# Patient Record
Sex: FEMALE
[De-identification: names, ages, dates, MRNs, and addresses within clinical notes are randomized per-mention and may not be internally consistent; named-entity substitution may affect disease eponyms.]

---

## 2018-03-17 ENCOUNTER — HOSPITAL ENCOUNTER (EMERGENCY)
Dept: HOSPITAL 31 - C.ER | Age: 73
Discharge: HOME | End: 2018-03-17
Payer: COMMERCIAL

## 2018-03-17 VITALS — TEMPERATURE: 97 F | SYSTOLIC BLOOD PRESSURE: 140 MMHG | HEART RATE: 84 BPM | DIASTOLIC BLOOD PRESSURE: 91 MMHG

## 2018-03-17 VITALS — OXYGEN SATURATION: 98 %

## 2018-03-17 VITALS — RESPIRATION RATE: 20 BRPM

## 2018-03-17 VITALS — BODY MASS INDEX: 25.7 KG/M2

## 2018-03-17 DIAGNOSIS — J11.1: Primary | ICD-10-CM

## 2018-03-17 NOTE — C.PDOC
History Of Present Illness


73 y/o female c/o fever 103 F , headache, nasal congestion, dry cough, sore 

throat, and body aches which began last night.Denies abdominal pain, n/v/d.  





HPI: Influenza


Time Seen by Provider: 03/17/18 14:16


Chief Complaint: ENT Problem


History Per: Patient


Exam Limitations: no limitations


Onset/Duration Of Symptoms: Days


Risk factors for flu complications: Yes: adult > 65 years





Past Medical History


Reviewed: Historical Data, Nursing Documentation, Vital Signs


Vital Signs: 


 Last Vital Signs











Temp  97.6 F   03/17/18 13:32


 


Pulse  76   03/17/18 13:32


 


Resp  20   03/17/18 13:32


 


BP  146/83   03/17/18 13:32


 


Pulse Ox  98   03/17/18 13:32














- Medical History


PMH: Arthritis, Asthma, Cardia Arrhythmia (occasional tachycardia last 2 yrs ago

), Gastritis, Gall Bladder Disease, HTN, Hypercholesterolemia, Peripheral Edema


Surgical History: Cholecystectomy





- [facility] Procedures








CLOSED [NEEDLE] BIOPSY OF SALIVARY GLAND OR DUCT (12/12/14)








Family History: States: No Known Family Hx





- Social History


Hx Tobacco Use: No


Hx Alcohol Use: No


Hx Substance Use: No





- Immunization History


Hx Tetanus Toxoid Vaccination: Yes


Hx Influenza Vaccination: Yes


Hx Pneumococcal Vaccination: Yes





Review Of Systems


Constitutional: Positive for: Fever, Malaise.  Negative for: Chills


ENT: Positive for: Throat Pain


Respiratory: Positive for: Cough (dry cough)


Gastrointestinal: Negative for: Nausea, Vomiting, Abdominal Pain, Diarrhea


Neurological: Positive for: Headache





Physical Exam





- Physical Exam


Appears: Non-toxic, Other (uncomfortable)


Skin: Warm, Dry


Head: Atraumatic, Normacephalic


Eye(s): bilateral: Normal Inspection


Ear(s): Bilateral: Normal


Throat: Erythema (mildly erythematous), No Exudate, Other (submandibular nodes 

bilaterally, no tonsillar enlargement)


Cardiovascular: Rhythm Regular, No Murmur


Respiratory: No Decreased Breath Sounds, No Accessory Muscle Use, No Rales, No 

Rhonchi, No Wheezing


Gastrointestinal/Abdominal: Soft, No Tenderness


Neurological/Psych: Oriented x3, Normal Speech, Normal Motor, Normal Sensation





Medical Decision Making


Medical Decision Making: 


Plan:


--Rapid Strep


--Tamiflu PO


--Tylenol PO 








- ECG


O2 Sat by Pulse Oximetry: 98 (RA)


Pulse Ox Interpretation: Normal





Disposition


Counseled Patient/Family Regarding: Studies Performed, Diagnosis, Need For 

Followup, Rx Given





- Disposition


Referrals: 


Michael Alaniz APN [Non-Staff] - 


Disposition: HOME/ ROUTINE


Disposition Time: 15:27


Condition: STABLE


Additional Instructions: 


Please take Tamiflu as prescribed. Tylenol for pain and fever every 4 hours if 

needed. Gargle with warm salty water or listerine. Drink increased fluids. 

Follow up with your doctor on Tues as scheduled. Return to ER for any worsening 

symptoms. 


Prescriptions: 


Acetaminophen [Tylenol 325mg tab] 650 mg PO Q4 #50 tab


Oseltamivir [Tamiflu] 75 mg PO BID #9 cap


Instructions:  Flu, Adult (DC)


Forms:  [facility] (English), General Discharge Instructions





- Clinical Impression


Clinical Impression: 


 Influenza-like illness








- PA / NP / Resident Statement


MD/DO has reviewed & agrees with the documentation as recorded.





- Scribe Statement


The provider has reviewed the documentation as recorded by the Aarti Coello





Provider Attestation





All medical record entries made by the Keniaibtere were at my direction and 

personally dictated by me. I have reviewed the chart and agree that the record 

accurately reflects my personal performance of the history, physical exam, 

medical decision making, and the department course for this patient. I have 

also personally directed, reviewed, and agree with the discharge instructions 

and disposition.

## 2019-01-14 ENCOUNTER — HOSPITAL ENCOUNTER (OUTPATIENT)
Dept: HOSPITAL 31 - C.ER | Age: 74
Setting detail: OBSERVATION
LOS: 3 days | Discharge: HOME | End: 2019-01-17
Attending: INTERNAL MEDICINE | Admitting: INTERNAL MEDICINE
Payer: COMMERCIAL

## 2019-01-14 VITALS — BODY MASS INDEX: 25.7 KG/M2

## 2019-01-14 DIAGNOSIS — J45.909: ICD-10-CM

## 2019-01-14 DIAGNOSIS — S02.82XA: Primary | ICD-10-CM

## 2019-01-14 DIAGNOSIS — R00.0: ICD-10-CM

## 2019-01-14 DIAGNOSIS — S62.002A: ICD-10-CM

## 2019-01-14 DIAGNOSIS — I10: ICD-10-CM

## 2019-01-14 DIAGNOSIS — S00.83XA: ICD-10-CM

## 2019-01-14 DIAGNOSIS — M11.232: ICD-10-CM

## 2019-01-14 DIAGNOSIS — S52.602A: ICD-10-CM

## 2019-01-14 DIAGNOSIS — S52.502A: ICD-10-CM
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
## 2019-01-14 LAB
APTT BLD: 32 SECONDS (ref 21–34)
BASOPHILS # BLD AUTO: 0.1 K/UL (ref 0–0.2)
BASOPHILS NFR BLD: 1.1 % (ref 0–2)
EOSINOPHIL # BLD AUTO: 0.2 K/UL (ref 0–0.7)
EOSINOPHIL NFR BLD: 1.8 % (ref 0–4)
ERYTHROCYTE [DISTWIDTH] IN BLOOD BY AUTOMATED COUNT: 12.8 % (ref 11.5–14.5)
HGB BLD-MCNC: 13.5 G/DL (ref 11–16)
INR PPP: 1
LYMPHOCYTES # BLD AUTO: 2.6 K/UL (ref 1–4.3)
LYMPHOCYTES NFR BLD AUTO: 30.3 % (ref 20–40)
MCH RBC QN AUTO: 32.9 PG (ref 27–31)
MCHC RBC AUTO-ENTMCNC: 34.6 G/DL (ref 33–37)
MCV RBC AUTO: 95 FL (ref 81–99)
MONOCYTES # BLD: 0.5 K/UL (ref 0–0.8)
MONOCYTES NFR BLD: 5.9 % (ref 0–10)
NEUTROPHILS # BLD: 5.3 K/UL (ref 1.8–7)
NEUTROPHILS NFR BLD AUTO: 60.9 % (ref 50–75)
NRBC BLD AUTO-RTO: 0.1 % (ref 0–2)
PLATELET # BLD: 185 K/UL (ref 130–400)
PMV BLD AUTO: 8.5 FL (ref 7.2–11.7)
PROTHROMBIN TIME: 11.2 SECONDS (ref 9.7–12.2)
RBC # BLD AUTO: 4.09 MIL/UL (ref 3.8–5.2)
WBC # BLD AUTO: 8.7 K/UL (ref 4.8–10.8)

## 2019-01-14 PROCEDURE — 97110 THERAPEUTIC EXERCISES: CPT

## 2019-01-14 PROCEDURE — 85025 COMPLETE CBC W/AUTO DIFF WBC: CPT

## 2019-01-14 PROCEDURE — 85610 PROTHROMBIN TIME: CPT

## 2019-01-14 PROCEDURE — 82553 CREATINE MB FRACTION: CPT

## 2019-01-14 PROCEDURE — 70450 CT HEAD/BRAIN W/O DYE: CPT

## 2019-01-14 PROCEDURE — 71045 X-RAY EXAM CHEST 1 VIEW: CPT

## 2019-01-14 PROCEDURE — 80053 COMPREHEN METABOLIC PANEL: CPT

## 2019-01-14 PROCEDURE — 97116 GAIT TRAINING THERAPY: CPT

## 2019-01-14 PROCEDURE — 70470 CT HEAD/BRAIN W/O & W/DYE: CPT

## 2019-01-14 PROCEDURE — 93005 ELECTROCARDIOGRAM TRACING: CPT

## 2019-01-14 PROCEDURE — 93306 TTE W/DOPPLER COMPLETE: CPT

## 2019-01-14 PROCEDURE — 81001 URINALYSIS AUTO W/SCOPE: CPT

## 2019-01-14 PROCEDURE — 96374 THER/PROPH/DIAG INJ IV PUSH: CPT

## 2019-01-14 PROCEDURE — 84484 ASSAY OF TROPONIN QUANT: CPT

## 2019-01-14 PROCEDURE — 97166 OT EVAL MOD COMPLEX 45 MIN: CPT

## 2019-01-14 PROCEDURE — 70480 CT ORBIT/EAR/FOSSA W/O DYE: CPT

## 2019-01-14 PROCEDURE — 99285 EMERGENCY DEPT VISIT HI MDM: CPT

## 2019-01-14 PROCEDURE — 82550 ASSAY OF CK (CPK): CPT

## 2019-01-14 PROCEDURE — 97162 PT EVAL MOD COMPLEX 30 MIN: CPT

## 2019-01-14 PROCEDURE — 97535 SELF CARE MNGMENT TRAINING: CPT

## 2019-01-14 PROCEDURE — 97530 THERAPEUTIC ACTIVITIES: CPT

## 2019-01-14 PROCEDURE — 36415 COLL VENOUS BLD VENIPUNCTURE: CPT

## 2019-01-14 PROCEDURE — 85730 THROMBOPLASTIN TIME PARTIAL: CPT

## 2019-01-14 PROCEDURE — 73110 X-RAY EXAM OF WRIST: CPT

## 2019-01-14 PROCEDURE — 73200 CT UPPER EXTREMITY W/O DYE: CPT

## 2019-01-14 NOTE — C.PDOC
History Of Present Illness


73 year old female presents to the emergency department status-post trauma after

her dog pulled her and she fell down 3 steps. Patient denies LOC and states that

she remembers the event. Patient states that she sustained abrasions over the 

left cheekbone, and also complaints of pain to the left wrist. 





- HPI


Time Seen by Provider: 01/14/19 19:43


Chief Complaint (Nursing): Trauma


History Per: Patient


History/Exam Limitations: no limitations


Onset/Duration Of Symptoms: Hrs


Injury Occurred (Timing): Hours Ago:


Location Of Injury: Left: Wrist, Anterior: Face


Severity: Moderate


Pain Scale Rating Of: 4





Past Medical History


Reviewed: Historical Data, Nursing Documentation, Vital Signs


Vital Signs: 





                                Last Vital Signs











Temp  98 F   01/14/19 19:33


 


Pulse  98 H  01/14/19 19:33


 


Resp  20   01/14/19 19:33


 


BP  164/98 H  01/14/19 19:33


 


Pulse Ox  98   01/14/19 19:33














- Medical History


PMH: Arthritis, Asthma, Cardia Arrhythmia (occasional tachycardia last 2 yrs 

ago), Gastritis, Gall Bladder Disease, HTN, Hypercholesterolemia, Peripheral 

Edema


Surgical History: Cholecystectomy





- [facility] Procedures











CLOSED [NEEDLE] BIOPSY OF SALIVARY GLAND OR DUCT (12/12/14)








Family History: States: No Known Family Hx





- Social History


Hx Tobacco Use: No


Hx Alcohol Use: No


Hx Substance Use: No





- Immunization History


Hx Tetanus Toxoid Vaccination: Yes


Hx Influenza Vaccination: Yes


Hx Pneumococcal Vaccination: Yes





Review Of Systems


Constitutional: Negative for: Fever


Gastrointestinal: Negative for: Nausea, Vomiting


Musculoskeletal: Positive for: Hand Pain (left wrist)


Skin: Positive for: Other (abrasion)


Neurological: Negative for: Weakness, Numbness





Physical Exam





- Physical Exam


Appears: Non-toxic, No Acute Distress


Skin: Warm, Dry


Head: Normacephalic, Abrasion (over the left maxillary sinus), No Other 

(crepitus)


Eye(s): bilateral: Normal Inspection, PERRL, EOMI


Ear(s): Bilateral: Normal


Nose: Normal, No Tenderness, No Septal Hematoma


Neck: Trachea Midline, Supple


Chest: Symmetrical, No Tenderness


Cardiovascular: Rhythm Regular, No Murmur


Respiratory: No Rales, No Rhonchi, No Wheezing


Extremity: Normal ROM (to all fingers ), Tenderness (tenderness to palpation to 

the left wrist), Capillary Refill (< 2 seconds), Swelling (to the left wrist)


Pulses: Left Radial: Normal, Right Radial: Normal


Neurological/Psych: Oriented x3





ED Course And Treatment





- Laboratory Results


Result Diagrams: 


                                 01/14/19 23:33





ECG: Interpreted By Me, Viewed By Me


ECG Rhythm: Sinus Rhythm (93), 1st Degree HB, Nonspecific Changes


O2 Sat by Pulse Oximetry: 98 (RA)


Pulse Ox Interpretation: Normal





- Radiology


CXR: Interpreted by Me, Viewed By Me





- Other Rad


  ** wrist


X-Ray: Interpreted by Me, Viewed By Me


Interpretation: comminuted distal radius and ulnar fracture


Progress Note: Plan:  CT Head.  CT Orbits.  Tylenol 975mg PO.  XR Left Wrist.  

sugar tong splint applied. Placed call to dr sanford - orthopedics





Disposition


Discussed With Dr.: Shankar Lazcano


Comment: accepted the pt on his service and took over the care at 10:15 PM


Doctor Will See Patient In The: Hospital


Counseled Patient/Family Regarding: Studies Performed, Diagnosis





- Disposition


Disposition: HOSPITALIZED


Disposition Time: 19:43


Condition: FAIR





- Clinical Impression


Clinical Impression: 


 Fall, Fracture of wrist, Minor head injury, Facial contusion








- Scribe Statement


The provider has reviewed the documentation as recorded by the Scribe (Estevan 

Nuria)


Provider Attestation: 


All medical record entries made by the Scribe were at my direction and 

personally dictated by me. I have reviewed the chart and agree that the record 

accurately reflects my personal performance of the history, physical exam, 

medical decision making, and the department course for this patient. I have also

 personally directed, reviewed, and agree with the discharge instructions and 

disposition.





Decision To Admit





- Pt Status Changed To:


Hospital Disposition Of: Observation





- .


Bed Request Type: Telemetry


Admitting Physician: Shankar Lazcano


Patient Diagnosis: 


 Fall, Fracture of wrist, Minor head injury

## 2019-01-14 NOTE — CP.PCM.HP
History of Present Illness





- History of Present Illness


History of Present Illness: 





73 year old female presents to the emergency department status-post trauma after

her dog pulled her and she fell down 3 steps. Patient denies LOC and states that

she remembers the event. Patient states that she sustained abrasions over the 

left cheekbone, and also complaints of pain to the left wrist





patient has a history of cardiac arrhythmia hypertension.


The x-ray of the left wrist showed comminuted fracture of the radius and ulna 

spiral fracture on the radius there is also mild fracture of the left orbit with

abnormal CT scan of the head of the frontal lobe.





Present on Admission





- Present on Admission


Any Indicators Present on Admission: No





Review of Systems





- Review of Systems


All systems: reviewed and no additional remarkable complaints except (pain in 

the left wrist and headache)





Past Patient History





- Infectious Disease


Hx of Infectious Diseases: None





- Past Medical History & Family History


Past Medical History?: Yes





- Past Social History


Smoking Status: Never Smoked





- CARDIAC


Hx Cardia Arrhythmia: Yes (occasional tachycardia last 2 yrs ago)


Hx Hypercholesterolemia: Yes


Hx Hypertension: Yes


Hx Peripheral Edema: Yes





- PULMONARY


Hx Asthma: Yes





- NEUROLOGICAL


Hx Neurological Disorder: Yes


Hx Dizziness: Yes





- HEENT


Hx HEENT Problems: Yes


Hx Cataracts: Yes (bilat iol)





- ENDOCRINE/METABOLIC


Hx Endocrine Disorders: No





- HEMATOLOGICAL/ONCOLOGICAL


Hx Blood Disorders: Yes


Hx Shingles: Yes (3 yrs ago)





- INTEGUMENTARY


Hx Dermatological Problems: No





- MUSCULOSKELETAL/RHEUMATOLOGICAL


Hx Arthritis: Yes





- GASTROINTESTINAL


Hx Gall Bladder Disease: Yes


Hx Gastritis: Yes





- GENITOURINARY/GYNECOLOGICAL


Hx Genitourinary Disorders: No





- PSYCHIATRIC


Hx Substance Use: No





- SURGICAL HISTORY


Hx Cholecystectomy: Yes





- ANESTHESIA


Hx Anesthesia: Yes


Hx Anesthesia Reactions: No


Hx Malignant Hyperthermia: No





Meds


Allergies/Adverse Reactions: 


                                    Allergies











Allergy/AdvReac Type Severity Reaction Status Date / Time


 


aspirin Allergy   Verified 01/14/19 19:38


 


Penicillins Allergy   Verified 01/14/19 19:38














Physical Exam





- Head Exam


Additional comments: 





contusion in the left upper orbit





- Eye Exam


Eye Exam: EOMI, Normal appearance, PERRL


Pupil Exam: NORMAL ACCOMODATION, PERRL





- ENT Exam


ENT Exam: Mucous Membranes Moist, Normal Exam





- Neck Exam


Neck exam: Positive for: Normal Inspection





- Respiratory Exam


Respiratory Exam: Clear to Auscultation Bilateral, NORMAL BREATHING PATTERN





- Cardiovascular Exam


Cardiovascular Exam: REGULAR RHYTHM





- GI/Abdominal Exam


GI & Abdominal Exam: Normal Bowel Sounds, Soft.  absent: Tenderness





- Extremities Exam


Extremities exam: Positive for: normal inspection





- Expanded Upper Extremities Exam


  ** Left


Shoulder exam: absent: abrasion, crepitus, deformity, dislocation, ecchymosis, 

erythema, full ROM, laceration, swelling, tenderness, tenderness over AC joint, 

normal inspection


Upper Arm exam: absent: abrasion, crepitus, deformity, dislocation, ecchymosis, 

erythema, full ROM, laceration, swelling, tenderness, normal inspection


Forearm Wrist exam: abrasion, deformity, swelling, tenderness





- Back Exam


Back exam: absent: CVA tenderness (L), CVA tenderness (R)





- Neurological Exam


Neurological exam: Alert, CN II-XII Intact, Normal Gait, Oriented x3, Reflexes 

Normal





- Psychiatric Exam


Psychiatric exam: Normal Affect, Normal Mood





Results





- Vital Signs


Recent Vital Signs: 





                                Last Vital Signs











Temp  98.7 F   01/14/19 21:26


 


Pulse  93 H  01/14/19 22:25


 


Resp  18   01/14/19 22:25


 


BP  126/79   01/14/19 22:25


 


Pulse Ox  98   01/14/19 22:50














- Labs


Result Diagrams: 


                                 01/14/19 23:33





                                 01/14/19 23:33


Labs: 





                         Laboratory Results - last 24 hr











  01/14/19





  23:33


 


WBC  8.7


 


RBC  4.09


 


Hgb  13.5


 


Hct  38.9


 


MCV  95.0


 


MCH  32.9 H


 


MCHC  34.6


 


RDW  12.8


 


Plt Count  185


 


MPV  8.5


 


Neut % (Auto)  60.9


 


Lymph % (Auto)  30.3


 


Mono % (Auto)  5.9


 


Eos % (Auto)  1.8


 


Baso % (Auto)  1.1


 


Neut # (Auto)  5.3


 


Lymph # (Auto)  2.6


 


Mono # (Auto)  0.5


 


Eos # (Auto)  0.2


 


Baso # (Auto)  0.1














Assessment & Plan


(1) Closed fracture of left distal radius


Status: Acute   


Comment: patient will be evaluated by orthopedic service and follow the 

recommendation patient will need some cardiac workup because there is no clear 

history of how she fell   





(2) Distal end of ulna fracture, closed


Status: Acute   





(3) Facial contusion


Status: Acute   





(4) Fall


Status: Acute   





(5) Minor head injury


Status: Acute   





(6) Asthma


Status: Chronic   





(7) HTN (hypertension)


Status: Chronic   





(8) Tachycardia


Status: Acute

## 2019-01-15 VITALS — RESPIRATION RATE: 20 BRPM

## 2019-01-15 LAB
ALBUMIN SERPL-MCNC: 4.4 G/DL (ref 3.5–5)
ALBUMIN/GLOB SERPL: 1.4 {RATIO} (ref 1–2.1)
ALT SERPL-CCNC: 28 U/L (ref 9–52)
AST SERPL-CCNC: 28 U/L (ref 14–36)
BACTERIA #/AREA URNS HPF: (no result) /[HPF]
BILIRUB UR-MCNC: NEGATIVE MG/DL
BUN SERPL-MCNC: 20 MG/DL (ref 7–17)
CALCIUM SERPL-MCNC: 10.7 MG/DL (ref 8.6–10.4)
CK MB SERPL-MCNC: 1.79 NG/ML (ref 0–3.38)
CK MB SERPL-MCNC: 1.88 NG/ML (ref 0–3.38)
CK MB SERPL-MCNC: 2.4 NG/ML (ref 0–3.38)
COLOR UR: YELLOW
GFR NON-AFRICAN AMERICAN: > 60
GLUCOSE UR STRIP-MCNC: NEGATIVE MG/DL
HYALINE CASTS #/AREA URNS LPF: (no result) /LPF (ref 0–2)
LEUKOCYTE ESTERASE UR-ACNC: NEGATIVE LEU/UL
PH UR STRIP: 6 [PH] (ref 5–8)
PROT UR STRIP-MCNC: NEGATIVE MG/DL
RBC # UR STRIP: (no result) /UL
SP GR UR STRIP: 1.01 (ref 1–1.03)
SQUAMOUS EPITHIAL: 3 /HPF (ref 0–5)

## 2019-01-15 NOTE — CT
Date of service: 



01/14/2019



PROCEDURE:  CT HEAD WITHOUT CONTRAST.



HISTORY:

fall



COMPARISON:

Unenhanced head CT 12/08/2014.



TECHNIQUE:

Axial computed tomography images were obtained through the head/brain 

without intravenous contrast.  



Radiation dose:



Total exam DLP = 1125.71 mGy-cm.



This CT exam was performed using one or more of the following dose 

reduction techniques: Automated exposure control, adjustment of the 

mA and/or kV according to patient size, and/or use of iterative 

reconstruction technique.



FINDINGS:



HEMORRHAGE:

No intracranial hemorrhage. 



BRAIN:

Good corticomedullary differentiation is seen. Proportional, minimal 

diffuse expansion of the ventriculosulcal and cisternal spaces is 

appreciated with minimal white matter lucency compatible with diffuse 

cerebral atrophy and chronic microangiopathy, once again.  No 

suspicious extra-axial fluid collection is identified and the midline 

brain anatomy appears grossly nonfocal as imaged. There is no mass 

effect throughout.



VENTRICLES:

Unremarkable. No hydrocephalus. 



CALVARIUM:

Unremarkable.



PARANASAL SINUSES:

Unremarkable as visualized. No significant inflammatory changes.



MASTOID AIR CELLS:

Unremarkable as visualized. No inflammatory changes.



OTHER FINDINGS:

None.



IMPRESSION:

Stable very limited age-related neuro degenerative changes are 

reiterated as compared prior CT 12/08/2014.  No definite acute 

intracranial findings by standard CT criteria.  Follow-up CT or MRI 

are available if clinically warranted. 



[facility] preliminary report from [place], 01/14/2019 8:56 p.m..

## 2019-01-15 NOTE — CP.PCM.CON
History of Present Illness





- History of Present Illness


History of Present Illness: 





Orthopedic consultation Dr. Epps





73F complains of left wrist and left side of head pain after fall when dog 

pulled her while walking dog last night. She is RHD. Denies numbness/tingling. 

Denies pain in her legs, right arm, neck, or back after the fall. The pain in 

her wrist is severe, and her wrist is deformed. Denies CP/SOB/dizziness. Denies 

any wrist pain prior to this fall. 





Review of Systems





- Review of Systems


All systems: reviewed and no additional remarkable complaints except





- Constitutional


Additional comments: 





no fever





- Cardiovascular


Cardiovascular: As Per HPI





- Respiratory


Respiratory: As Per HPI





- Gastrointestinal


Additional comments: 





denies n/v





- Musculoskeletal


Musculoskeletal: As Per HPI





- Integumentary


Additional comments: 





abrasions to hand





- Neurological


Neurological: As Per HPI





- Hematologic/Lymphatic


Hematologic: absent: As Per HPI, Easy Bleeding, Easy Bruising, Lymphadenopathy, 

Other





Past Patient History





- Infectious Disease


Hx of Infectious Diseases: None





- Past Medical History & Family History


Past Medical History?: Yes


Past Family History: Reviewed and not pertinent





- Past Social History


Smoking Status: Never Smoked





- CARDIAC


Hx Cardia Arrhythmia: Yes (occasional tachycardia last 2 yrs ago)


Hx Hypercholesterolemia: Yes


Hx Hypertension: Yes


Hx Peripheral Edema: Yes





- PULMONARY


Hx Asthma: Yes





- NEUROLOGICAL


Hx Neurological Disorder: Yes


Hx Dizziness: Yes





- HEENT


Hx HEENT Problems: Yes


Hx Cataracts: Yes (bilat iol)





- ENDOCRINE/METABOLIC


Hx Endocrine Disorders: No





- HEMATOLOGICAL/ONCOLOGICAL


Hx Blood Disorders: Yes


Hx Shingles: Yes (3 yrs ago)





- INTEGUMENTARY


Hx Dermatological Problems: No





- MUSCULOSKELETAL/RHEUMATOLOGICAL


Hx Arthritis: Yes





- GASTROINTESTINAL


Hx Gall Bladder Disease: Yes


Hx Gastritis: Yes





- GENITOURINARY/GYNECOLOGICAL


Hx Genitourinary Disorders: No





- PSYCHIATRIC


Hx Substance Use: No





- SURGICAL HISTORY


Hx Cholecystectomy: Yes





- ANESTHESIA


Hx Anesthesia: Yes


Hx Anesthesia Reactions: No


Hx Malignant Hyperthermia: No





Meds


Allergies/Adverse Reactions: 


                                    Allergies











Allergy/AdvReac Type Severity Reaction Status Date / Time


 


aspirin Allergy   Verified 01/14/19 19:38


 


Penicillins Allergy   Verified 01/14/19 19:38














- Medications


Medications: 


                               Current Medications





Ketorolac Tromethamine (Toradol)  30 mg IVP Q6 PRN


   PRN Reason: Pain, moderate (4-7)


   Last Admin: 01/15/19 02:38 Dose:  30 mg











Physical Exam





- Constitutional


Appears: Well, No Acute Distress





- Head Exam


Additional comments: 





ecchymosis to left periorbital area





- Eye Exam


Eye Exam: Periorbital swelling, Periorbital tenderness





- Neck Exam


Neck exam: Positive for: Full Rom, Normal Inspection





- Respiratory Exam


Respiratory Exam: NORMAL BREATHING PATTERN





- Cardiovascular Exam


Additional comments: 





+radial/ulnar pulses





- Expanded Upper Extremities Exam


  ** Left


Shoulder exam: full ROM (NT), normal inspection


Elbow exam: full ROM, normal inspection (NT)


Forearm Wrist exam: deformity, swelling


Neuro motor exam: finger 2-5 abduction intact, thumb abduction, thumb IP flexion

intact, thumb opposition intact, wrist extension intact


Neurosensory exam: 2-poit discrimination, median nerve intact, radial nerve 

intact


Vascular exam: radial pulse, ulnar pulse





- Back Exam


Back exam: NORMAL INSPECTION


Additional comments: 





NT





- Neurological Exam


Neurological exam: Alert, Oriented x3





- Psychiatric Exam


Psychiatric exam: Normal Affect, Normal Mood





- Skin


Skin Exam: Dry, Intact, Warm


Additional comments: 





left wrist skin intact, small abrasions to middle finger








Results





- Vital Signs


Recent Vital Signs: 


                                Last Vital Signs











Temp  98.1 F   01/15/19 07:07


 


Pulse  76   01/15/19 07:07


 


Resp  20   01/15/19 07:07


 


BP  122/73   01/15/19 07:07


 


Pulse Ox  94 L  01/15/19 07:07














- Labs


Result Diagrams: 


                                 01/14/19 23:33





                                 01/14/19 23:33


Labs: 


                         Laboratory Results - last 24 hr











  01/14/19 01/14/19 01/14/19





  23:33 23:33 23:33


 


WBC  8.7  


 


RBC  4.09  


 


Hgb  13.5  


 


Hct  38.9  


 


MCV  95.0  


 


MCH  32.9 H  


 


MCHC  34.6  


 


RDW  12.8  


 


Plt Count  185  


 


MPV  8.5  


 


Neut % (Auto)  60.9  


 


Lymph % (Auto)  30.3  


 


Mono % (Auto)  5.9  


 


Eos % (Auto)  1.8  


 


Baso % (Auto)  1.1  


 


Neut # (Auto)  5.3  


 


Lymph # (Auto)  2.6  


 


Mono # (Auto)  0.5  


 


Eos # (Auto)  0.2  


 


Baso # (Auto)  0.1  


 


PT   11.2 


 


INR   1.0 


 


APTT   32 


 


Sodium    135


 


Potassium    4.3


 


Chloride    101


 


Carbon Dioxide    25


 


Anion Gap    13


 


BUN    20 H


 


Creatinine    0.8


 


Est GFR ( Amer)    > 60


 


Est GFR (Non-Af Amer)    > 60


 


Random Glucose    109 H


 


Calcium    10.7 H


 


Total Bilirubin    0.9


 


AST    28


 


ALT    28


 


Alkaline Phosphatase    112


 


Total Creatine Kinase    148 H


 


CK-MB (Mass)    2.40


 


Troponin I    < 0.0120


 


Total Protein    7.6


 


Albumin    4.4


 


Globulin    3.2


 


Albumin/Globulin Ratio    1.4


 


Urine Color   


 


Urine Clarity   


 


Urine pH   


 


Ur Specific Gravity   


 


Urine Protein   


 


Urine Glucose (UA)   


 


Urine Ketones   


 


Urine Blood   


 


Urine Nitrate   


 


Urine Bilirubin   


 


Urine Urobilinogen   


 


Ur Leukocyte Esterase   


 


Urine WBC (Auto)   


 


Urine RBC (Auto)   


 


Ur Squamous Epith Cells   


 


Urine Bacteria   


 


Hyaline Casts   














  01/15/19 01/15/19





  02:14 06:05


 


WBC  


 


RBC  


 


Hgb  


 


Hct  


 


MCV  


 


MCH  


 


MCHC  


 


RDW  


 


Plt Count  


 


MPV  


 


Neut % (Auto)  


 


Lymph % (Auto)  


 


Mono % (Auto)  


 


Eos % (Auto)  


 


Baso % (Auto)  


 


Neut # (Auto)  


 


Lymph # (Auto)  


 


Mono # (Auto)  


 


Eos # (Auto)  


 


Baso # (Auto)  


 


PT  


 


INR  


 


APTT  


 


Sodium  


 


Potassium  


 


Chloride  


 


Carbon Dioxide  


 


Anion Gap  


 


BUN  


 


Creatinine  


 


Est GFR ( Amer)  


 


Est GFR (Non-Af Amer)  


 


Random Glucose  


 


Calcium  


 


Total Bilirubin  


 


AST  


 


ALT  


 


Alkaline Phosphatase  


 


Total Creatine Kinase   121


 


CK-MB (Mass)   1.88


 


Troponin I   < 0.0120


 


Total Protein  


 


Albumin  


 


Globulin  


 


Albumin/Globulin Ratio  


 


Urine Color  Yellow 


 


Urine Clarity  Clear 


 


Urine pH  6.0 


 


Ur Specific [place]  1.011 


 


Urine Protein  Negative 


 


Urine Glucose (UA)  Negative 


 


Urine Ketones  Negative 


 


Urine Blood  1+ H 


 


Urine Nitrate  Negative 


 


Urine Bilirubin  Negative 


 


Urine Urobilinogen  Normal 


 


Ur Leukocyte Esterase  Negative 


 


Urine WBC (Auto)  1 


 


Urine RBC (Auto)  4 H 


 


Ur Squamous Epith Cells  3 


 


Urine Bacteria  Rare 


 


Hyaline Casts  0-2 














- Impressions


Impression: 





Left wrist xrays: 3 views AP/Lat/oblique Shows intraarticular comminuted left 

distal radius fracture and comminuted distal ulnar fracture with significant 

shortening and ulnar deviation. 





CT scan reviewed: as above, osteopenia, fibrocystic changes to scaphoid, lunate,

capitellum, fracture to lunate, ?fx to scaphoid, chondrocalcinosis, DJD, STS








atient Name / ID : MAXIM POOL  / [id number]


Exam Date : 01/14/2019 20:13:04 ( Approved )


Study Comment : 


Sex / Age : F  / 73Y





Creator : Caldwell-Lombardi, Kathleen


Dictator : Caldwell-Lombardi, Kathleen


 : 


Approver : Caldwell-Lombardi, Kathleen


Approver2 : 





Report Date : 01/15/2019 08:41:30


My Comment : 


*********************************************************

**************************





Date of service: 





01/14/2019





PROCEDURE:  Left Wrist Radiographs.





   





HISTORY:


fall





COMPARISON:


None.





FINDINGS:





BONES:


Comminuted distal radial metaphyseal to epiphyseal and distal radial primarily 

metaphyseal comminuted fractures present.  Impaction the radial fracture with 

mainly radial sided apical angulation noted.  Distal fracture fragment displaced

towards the ulna. 





Ulnar fracture fragment is volarly displaced.  





JOINTS:


Radiocarpal joint space appears narrowed cystic changes at the lunate and 

navicular noted.  Navicular lunate spacing slightly increased in appearance 





Vague opacity either extensive triangular fibrocartilage complex calcification. 

Concomitant flake ossific fragments precise donor sites not known projecting 

over this region-another consideration. 





First carpal metacarpal joint arthrosis. 





SOFT TISSUES:


Diffusely swollen 





OTHER FINDINGS:








Vague opacity either extensive triangular fibrocartilage complex calcification. 

Concomitant flake ossific fragments precise donor sites not known projecting 

over this region-another consideration. 











IMPRESSION:


Comminuted and displaced fracture fragments distal radius and ulna as above.  

Radial fracture with radiocarpal and radial ulnar joint extension.





Other findings as above.




















Assessment & Plan


(1) Closed fracture of left distal radius


Assessment and Plan: 


closed reduction attempted, sugar tong splint applied, well padded, NVID pre and

post reduction/splint application


will follow up post reduction/splinting xrays


elevation


ice


sling


d/w Dr. Epps, agrees with above, indicated for ORIF of left wrist, will f/u p

david with Dr. Epps








Imaging reviewed by Dr. Epps, unable to maintain reduction. Patient will need 

ORIF. Per Dr. Epps, patient can maintain splint at all times, elevate, sling, 

and follow up as outpatient with partner Dr. Hu hand surgeon within 2-3 days 

call for appointment [phone]. No surgery to be done today, diet ordered. 

For ORIF when swelling decreased as well. 


Status: Acute   





(2) Distal end of ulna fracture, closed


Status: Acute   





(3) Fracture of lunate, left wrist, closed


Status: Acute   





Procedures


Attestation:: I certify that I have explained the specified Operation(s) or 

Procedure(s), risks, benefits and reasonable alternatives to the Patient and/or 

other person responsible. The opportunity was given to ask questions and all 

questions answered





- Orthopedic Fracture Reduction


  ** Fracture #1


Consent Obtained: verbal consent


Time Out Performed: Yes


Side: left


Fracture Reduction Location: radius, ulna


Technique: direct manipulation


Post-Reduction Neuro Exam: intact


Post-Reduction Vascular Exam: intact


Splint Applied: Yes (sugar tong splint applied)


Patient Tolerated Procedure: well





- Orthopedic Splinting/Casting


  ** Injury #1


Side: left


Upper Extremity Injury Location: wrist


Upper Extremity Immobilizer: sugar tong splint (removed volar/dorsal splint 

applied by ER, elbow not immobilizer, not sugar tong as documented by ER. well 

padded sugar tong splint applied. )

## 2019-01-15 NOTE — CT
CT left hand and wrist 



HISTORY:

Injury. 



Comparison: None available. 



Technique: Multiple contiguous axial images were performed through 

the left hand and wrist without the use of intravenous contrast.  

Subsequently, sagittal and coronal reformatted images were obtained. 



This CT exam was performed using one or more of the following dose 

reduction techniques: Automated exposure control, adjustment of the 

mA and/or kV according to patient size, and/or use of iterative 

reconstruction technique.



Findings: 



Please note given the large field of view, evaluation for osseous 

injury particularly at the level of the carpal bones is limited. 



Comminuted, distracted, angulated fracture deformities of the distal 

radius with intra-articular extension.  Prominent comminuted fracture 

fragments are seen at both the volar and dorsal aspect of the distal 

radius for example a prominent volar sided fragment measures up to 

1.4 centimeters. 



Comminuted and angulated distal ulnar fracture with dorsal sided 

distraction of the distal fracture fragment and smaller comminuted 

fracture fragments surrounding the fracture site.



Extensive chondrocalcinosis at the level of proximal and mid carpal 

rows, markedly limit evaluation for fracture deformity at this level.



Extensive large subchondral cyst formation and or intraosseous geode 

formation are noted within the carpal bones including the scaphoid, 

lunate, triquetrum, trapezoid, and capitate bones.



In addition, there appears to be some cortical irregularity as 

demonstrated on series 601, image 59 at the dorsal aspect of the 

lunate bone concerning for possible osseous injury.  This would be 

better delineated with MRI if clinically indicated.



In addition, there is a curvilinear lucency seen at the dorsal aspect 

of the scaphoid bone on series 601, image 51 which may represent a 

prominent vascular groove extending to a subchondral cyst and or 

subtle osseous injury.  Again correlation with MRI may be helpful if 

clinically indicated.



Subluxation at the level of the 1st carpometacarpal joint space with 

degenerative changes.



Some chondrocalcinosis noted at the volar aspect of the trapezoid 

bone mimics osseous injury.



Diastasis of the scapholunate interval which may represent developing 

SLAC wrist.



Chondrocalcinosis at the dorsal aspect of the triquetrum bone, mimics 

osseous injury.



Negative ulnar variance.



Impression:



Please note given the large field of view, evaluation for osseous 

injury particularly at the level of the carpal bones is limited. 



1. Comminuted, distracted, angulated fracture deformities of the 

distal radius with intra-articular extension.  Prominent comminuted 

fracture fragments are seen at both the volar and dorsal aspect of 

the distal radius for example a prominent volar sided fragment 

measures up to 1.4 centimeters. 



2. Comminuted and angulated distal ulnar fracture with dorsal sided 

distraction of the distal fracture fragment and smaller comminuted 

fracture fragments surrounding the fracture site.



3. Extensive chondrocalcinosis at the level of proximal and mid 

carpal rows, markedly limit evaluation for fracture deformity at this 

level.



4. Extensive large subchondral cyst formation and or intraosseous 

geode formation are noted within the carpal bones including the 

scaphoid, lunate, triquetrum, trapezoid, and capitate bones.



5. In addition, there appears to be some cortical irregularity as 

demonstrated on series 601, image 59 at the dorsal aspect of the 

lunate bone concerning for possible osseous injury.  This would be 

better delineated with MRI if clinically indicated.



6. In addition, there is a curvilinear lucency seen at the dorsal 

aspect of the scaphoid bone on series 601, image 51 which may 

represent a prominent vascular groove extending to a subchondral cyst 

and or subtle osseous injury.  Again correlation with MRI may be 

helpful if clinically indicated.



7. Subluxation at the level of the 1st carpometacarpal joint space 

with degenerative changes.



8. Some chondrocalcinosis noted at the volar aspect of the trapezoid 

bone mimics osseous injury.



9. Diastasis of the scapholunate interval which may represent 

developing SLAC wrist.



10. Chondrocalcinosis at the dorsal aspect of the triquetrum bone, 

mimics osseous injury.



11. Negative ulnar variance.



A preliminary report was generated at 3:02 a.m. on 01/15/2018 by Dr. Beni Green from [facility].

## 2019-01-15 NOTE — RAD
Date of service: 



01/15/2019



PROCEDURE:  Left Wrist Radiographs.







HISTORY:

repeat in splint



COMPARISON:

1/14/2019



FINDINGS:



BONES:

The comminuted and displaced distal radial and ulnar fractures 

intra-articular extension are similar in their position in overall 

appearance.  Currently examination is made through casting material 

obscuring bony detail. 



JOINTS:

No dislocation.  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



SOFT TISSUES:

Altered density calcification with or without flake fracture 

fragments projects over the fibrocartilage complex. 



OTHER FINDINGS:

None.



IMPRESSION:

Casting/immobilization of the previously referenced comminuted and 

displaced radial and ulnar fractures with prior intra-articular 

involvement noted.  No change in position appreciated.

## 2019-01-15 NOTE — RAD
Date of service: 



01/14/2019



PROCEDURE:  Left Wrist Radiographs.







HISTORY:

fall



COMPARISON:

None.



FINDINGS:



BONES:

Comminuted distal radial metaphyseal to epiphyseal and distal radial 

primarily metaphyseal comminuted fractures present.  Impaction the 

radial fracture with mainly radial sided apical angulation noted.  

Distal fracture fragment displaced towards the ulna. 



Ulnar fracture fragment is volarly displaced.  



JOINTS:

Radiocarpal joint space appears narrowed cystic changes at the lunate 

and navicular noted.  Navicular lunate spacing slightly increased in 

appearance 



Vague opacity either extensive triangular fibrocartilage complex 

calcification.  Concomitant flake ossific fragments precise donor 

sites not known projecting over this region-another consideration. 



First carpal metacarpal joint arthrosis. 



SOFT TISSUES:

Diffusely swollen 



OTHER FINDINGS:





Vague opacity either extensive triangular fibrocartilage complex 

calcification.  Concomitant flake ossific fragments precise donor 

sites not known projecting over this region-another consideration. 







IMPRESSION:

Comminuted and displaced fracture fragments distal radius and ulna as 

above.  Radial fracture with radiocarpal and radial ulnar joint 

extension.



Other findings as above.

## 2019-01-15 NOTE — RAD
Date of service: 



01/14/2019



HISTORY:

 SOB 



COMPARISON:

None available.



FINDINGS:



LUNGS:

Linear atelectasis or fibrosis in the bilateral lung bases however 

there is no alveolitis or patchy atelectasis appreciated.



PLEURA:

No significant pleural effusion identified, no pneumothorax apparent.



CARDIOVASCULAR:

No aortic atherosclerotic calcification present.



 Normal cardiac size. No pulmonary vascular congestion. 



OSSEOUS STRUCTURES:

No significant abnormalities.



VISUALIZED UPPER ABDOMEN:

Normal.



OTHER FINDINGS:

None.



IMPRESSION:

Limited linear atelectasis or fibrosis bilateral bases with remaining 

lung fields clear.  No pulmonary vascular congestion identified.

## 2019-01-15 NOTE — CT
Date of service: 



01/15/2019



PROCEDURE:  CT HEAD WITH AND WITHOUT CONTRAST



HISTORY:

fall



COMPARISON:

None available.



TECHNIQUE:

CT examination was the performed through the head using serial axial 

sections prior to and following intravenous contrast administration.  

Reformatted dataset provided on postcontrast enhanced imaging. 



Contrast dose: Visipaque 320, 100 cc



Radiation dose:



Total exam DLP = 2031.61 mGy-cm.



This CT exam was performed using one or more of the following dose 

reduction techniques: Automated exposure control, adjustment of the 

mA and/or kV according to patient size, and/or use of iterative 

reconstruction technique.



FINDINGS:



HEMORRHAGE:

No intracranial hemorrhage. 



BRAIN:

Limited pattern diffuse cerebral atrophy chronic microangiopathy is 

reiterated throughout the cerebrum with no mass effect identified in 

the interval or parenchymal edema appreciated.  Sulci and cisterns 

remain unremarkable as well as midline brain anatomy.  Following 

intravenous venous contrast administration, there is no abnormal 

intracranial enhancement appreciate including intra and extra-axial 

spaces. 



VENTRICLES:

Unremarkable. No hydrocephalus. 



CALVARIUM:

No destructive bony lesion or displaced fracture identified including 

through the skullbase.



SINUSES:

Unremarkable as visualized. No significant inflammatory changes.



MASTOID AIR CELLS:

Unremarkable as visualized. No mastoid effusion. 



OTHER FINDINGS:

None.



IMPRESSION:

Stable limited age-related neuro degenerative findings as discussed 

above with no abnormal intracranial enhancement appreciable.  Once 

again, no fracture identified.

## 2019-01-15 NOTE — CT
Date of service: 



01/14/2019



PROCEDURE:  CT MAXILLOFACIAL BONES WITHOUT CONTRAST



HISTORY:

fall



COMPARISON:

None available.



TECHNIQUE:

Contiguous axial CT  images of the maxillofacial bones were obtained. 

Coronal and sagittal reformats were generated.



Radiation dose:



Total exam DLP = 779.57 mGy-cm.



This CT exam was performed using one or more of the following dose 

reduction techniques: Automated exposure control, adjustment of the 

mA and/or kV according to patient size, and/or use of iterative 

reconstruction technique.



FINDINGS:



NASAL BONES:

Unremarkable.



ORBITS:

Limited lateral left periorbital soft tissue edema with the orbits 

otherwise intact and unremarkable.  No fracture identified.



PARANASAL SINUSES/ MASTOIDS:

Limited mucosal inflammatory changes seen at the nondependent left 

maxilla including at the maxillary ostium.



MAXILLA:

No fracture identified however moderate soft tissue edema is seen in 

the premalar soft tissue/cheek without fluid collection evident.  No 

emphysema soft tissue changes.



MANDIBLE/ TEMPOROMANDIBULAR JOINTS:

Unremarkable.



SKULL BASE:

Unremarkable.



TEMPORAL BONES:

Middle ears and mastoid grossly unremarkable.



OTHER FINDINGS:

None.



IMPRESSION:

1. No fracture destructive bony lesions appreciated throughout the 

facial bones. 



2. Very mild lateral periorbital soft tissue edema with the orbits 

intact and unremarkable limiting postseptal tissues. 



3. Mild-to-moderate left cheek soft tissue edema without underlying 

fracture appreciable. 



[facility] preliminary report from [facility], 01/14/2019, 8:59 p.m..

## 2019-01-16 LAB
ALBUMIN SERPL-MCNC: 4.1 G/DL (ref 3.5–5)
ALBUMIN/GLOB SERPL: 1.4 {RATIO} (ref 1–2.1)
ALT SERPL-CCNC: 14 U/L (ref 9–52)
AST SERPL-CCNC: 21 U/L (ref 14–36)
BASOPHILS # BLD AUTO: 0 K/UL (ref 0–0.2)
BASOPHILS NFR BLD: 0.6 % (ref 0–2)
BUN SERPL-MCNC: 26 MG/DL (ref 7–17)
CALCIUM SERPL-MCNC: 10.6 MG/DL (ref 8.6–10.4)
EOSINOPHIL # BLD AUTO: 0.1 K/UL (ref 0–0.7)
EOSINOPHIL NFR BLD: 1.7 % (ref 0–4)
ERYTHROCYTE [DISTWIDTH] IN BLOOD BY AUTOMATED COUNT: 13 % (ref 11.5–14.5)
GFR NON-AFRICAN AMERICAN: > 60
HGB BLD-MCNC: 12.7 G/DL (ref 11–16)
LYMPHOCYTES # BLD AUTO: 2.2 K/UL (ref 1–4.3)
LYMPHOCYTES NFR BLD AUTO: 28.1 % (ref 20–40)
MCH RBC QN AUTO: 32.6 PG (ref 27–31)
MCHC RBC AUTO-ENTMCNC: 34.5 G/DL (ref 33–37)
MCV RBC AUTO: 94.7 FL (ref 81–99)
MONOCYTES # BLD: 0.5 K/UL (ref 0–0.8)
MONOCYTES NFR BLD: 6.9 % (ref 0–10)
NEUTROPHILS # BLD: 5 K/UL (ref 1.8–7)
NEUTROPHILS NFR BLD AUTO: 62.7 % (ref 50–75)
NRBC BLD AUTO-RTO: 0 % (ref 0–2)
PLATELET # BLD: 174 K/UL (ref 130–400)
PMV BLD AUTO: 8.7 FL (ref 7.2–11.7)
RBC # BLD AUTO: 3.89 MIL/UL (ref 3.8–5.2)
WBC # BLD AUTO: 7.9 K/UL (ref 4.8–10.8)

## 2019-01-16 RX ADMIN — PANTOPRAZOLE SODIUM SCH MG: 40 TABLET, DELAYED RELEASE ORAL at 10:28

## 2019-01-16 NOTE — CARD
--------------- APPROVED REPORT --------------





Date of service: 01/16/2019



EXAM: Two-dimensional and M-mode echocardiogram with Doppler and 

color Doppler.



Other Information 

Quality : Rhythm : Tachycardia



INDICATION

Abnormal EKG/Arrhythmia Dizziness and Vertigo Peripheral Edema 



RISK FACTORS

Hypertension 

Hyperlipidemia



2D DIMENSIONS 

IVSd1.0   (0.7-1.1cm)Aortic Root (2D)2.8   (2.0-3.7cm)

LVDd3.6   (3.9-5.9cm)PWd1.0   (0.7-1.1cm)

LA Cupsmw57   (18-58mL)LVDs2.2   (2.5-4.0cm)

FS (%) 39.1   %LVEF (%)70.5   (>50%)

LVEF ([employer])65 %IVC0.00 cm



M-Mode DIMENSIONS 

Left Atrium (MM)3.87   (2.5-4.0cm)IVSd0.85   (0.7-1.1cm)

Aortic Root3.31   (2.2-3.7cm)LVDd4.23   (4.0-5.6cm)

Aortic Cusp Exc.1.82   (1.5-2.0cm)PWd0.68   (0.7-1.1cm)

FS (%) 42   %LVDs2.47   (2.0-3.8cm)

TAPSE13.67 cmLVEF (%)68   (>50%)



Mitral Valve

MV E Qirxvlaz25.9cm/sMV A Axsfovyj73.9cm/sE/A ratio2.4



TDI

Lateral E' Peak V10.42cm/sMedial E' Peak V5.58cm/sE/Lateral E'8.8

E/Medial E'16.5



Tricuspid Valve

TR Peak Ylegwqge547vs/sTR Peak Gr.16hqJdATHZ55vmNj



 LEFT VENTRICLE 

The left ventricle is normal size.

There is borderline concentric left ventricular hypertrophy.

The left ventricular function is normal.

The left ventricular ejection fraction is within the normal range.

There is normal LV segmental wall motion.

Transmitral Doppler flow pattern is Grade I-abnormal relaxation 

pattern.



 RIGHT VENTRICLE 

The right ventricle is normal size.

There is normal right ventricular wall thickness.

The right ventricular systolic function is normal.



 ATRIA 

The left atrium size is normal.

The right atrium size is normal.



 AORTIC VALVE 

The aortic valve is normal in structure.

No aortic regurgitation is present.

There is no aortic valvular stenosis. 



 MITRAL VALVE 

The mitral valve is mildly thickened.

There is no evidence of mitral valve prolapse.

There is no mitral valve stenosis.

There is no mitral valve regurgitation noted.



 TRICUSPID VALVE 

The tricuspid valve is normal in structure.

There is mild pulmonary hypertension.



 PULMONIC VALVE 

There is trace pulmonic valvular regurgitation. 



 GREAT VESSELS 

The aortic root is normal in size.

The IVC is normal in size and collapses >50% with inspiration.



 PERICARDIAL EFFUSION 

There is no pericardial effusion.



<Conclusion>

There is borderline concentric left ventricular hypertrophy.

The left ventricular function is normal.

The left ventricular ejection fraction is within the normal range.

There is normal LV segmental wall motion.

Transmitral Doppler flow pattern is Grade I-abnormal relaxation 

pattern.

There is mild pulmonary hypertension.

## 2019-01-16 NOTE — CP.PCM.PN
Subjective





- Date & Time of Evaluation


Date of Evaluation: 01/16/19


Time of Evaluation: 11:46





- Subjective


Subjective: 





Patient has moderate amount of pain


After open reduction x-ray shows no change from the previous one


CAT scan of the head is still pending


Awaiting orthopedic input.





Objective





- Vital Signs/Intake and Output


Vital Signs (last 24 hours): 


                                        











Temp Pulse Resp BP Pulse Ox


 


 98.4 F   98 H  20   108/66   95 


 


 01/16/19 07:48  01/16/19 10:27  01/16/19 07:48  01/16/19 10:27  01/16/19 07:48











- Medications


Medications: 


                               Current Medications





Ergocalciferol (Drisdol 50,000 Intl Units Cap)  1 cap PO QWK [facility]


   Last Admin: 01/16/19 10:28 Dose:  1 cap


Gabapentin (Neurontin)  300 mg PO BID [facility]


   Last Admin: 01/16/19 10:28 Dose:  300 mg


Hydrochlorothiazide (Microzide)  12.5 mg PO DAILY [facility]


   Last Admin: 01/16/19 10:28 Dose:  12.5 mg


Ketorolac Tromethamine (Toradol)  30 mg IVP Q6 PRN


   PRN Reason: Pain, moderate (4-7)


   Last Admin: 01/16/19 06:17 Dose:  30 mg


Losartan Potassium (Cozaar)  100 mg PO DAILY [facility]


   Last Admin: 01/16/19 10:28 Dose:  100 mg


Montelukast Sodium (Singulair)  10 mg PO HS [facility]


   Last Admin: 01/15/19 22:28 Dose:  Not Given


Pantoprazole Sodium (Protonix Ec Tab)  40 mg PO DAILY [facility]


   Last Admin: 01/16/19 10:28 Dose:  40 mg











- Labs


Labs: 


                                        





                                 01/14/19 23:33 





                                 01/14/19 23:33 





                                        











PT  11.2 SECONDS (9.7-12.2)   01/14/19  23:33    


 


INR  1.0   01/14/19  23:33    


 


APTT  32 SECONDS (21-34)   01/14/19  23:33    














Assessment and Plan


(1) Closed fracture of left distal radius


Status: Acute   





(2) Distal end of ulna fracture, closed


Status: Acute   





(3) Facial contusion


Status: Acute   





(4) Fall


Status: Acute   





(5) Minor head injury


Status: Acute   





(6) Asthma


Status: Chronic   





(7) HTN (hypertension)


Status: Chronic   





(8) Tachycardia


Status: Acute

## 2019-01-16 NOTE — CARD
--------------- APPROVED REPORT --------------





Date of service: 01/14/2019



EKG Measurement

Heart [id number]

[id number]

[id number]

[id number]

[id number]



<Conclusion>

Sinus rhythm with 1st degree AV block

Low voltage QRS

Cannot rule out Anterior infarct, age undetermined

Abnormal ECG

## 2019-01-16 NOTE — CP.PCM.PN
Subjective





- Date & Time of Evaluation


Date of Evaluation: 01/16/19


Time of Evaluation: 17:00





- Subjective


Subjective: 





PATIENT SEEN AND EXAMINED AT THE BEDSIDE





Objective





- Vital Signs/Intake and Output


Vital Signs (last 24 hours): 


                                        











Temp Pulse Resp BP Pulse Ox


 


 97.6 F   79   20   108/71   97 


 


 01/16/19 15:00  01/16/19 16:30  01/16/19 15:00  01/16/19 15:00  01/16/19 15:00











- Medications


Medications: 


                               Current Medications





Ergocalciferol (Drisdol 50,000 Intl Units Cap)  1 cap PO QWK [facility]


   Last Admin: 01/16/19 10:28 Dose:  1 cap


Gabapentin (Neurontin)  300 mg PO BID [facility]


   Last Admin: 01/16/19 10:28 Dose:  300 mg


Hydrochlorothiazide (Microzide)  12.5 mg PO DAILY [facility]


   Last Admin: 01/16/19 10:28 Dose:  12.5 mg


Ketorolac Tromethamine (Toradol)  30 mg IVP Q6 PRN


   PRN Reason: Pain, moderate (4-7)


   Last Admin: 01/16/19 06:17 Dose:  30 mg


Losartan Potassium (Cozaar)  100 mg PO DAILY [facility]


   Last Admin: 01/16/19 10:28 Dose:  100 mg


Montelukast Sodium (Singulair)  10 mg PO HS [facility]


   Last Admin: 01/15/19 22:28 Dose:  Not Given


Pantoprazole Sodium (Protonix Ec Tab)  40 mg PO DAILY [facility]


   Last Admin: 01/16/19 10:28 Dose:  40 mg











- Labs


Labs: 


                                        





                                 01/16/19 11:36 





                                 01/16/19 11:36 





                                        











PT  11.2 SECONDS (9.7-12.2)   01/14/19  23:33    


 


INR  1.0   01/14/19  23:33    


 


APTT  32 SECONDS (21-34)   01/14/19  23:33    














Assessment and Plan





- Assessment and Plan (Free Text)


Assessment: 





FOLLOW UP WITH DR LILLY IN HID OFFICE -----CALL FOR APPOINTEMNT


FOLLOW UP WITH DR SAWANT IN HIS OFFICE ON FRIDAY 


   ADDRESS ORIF of left wrist AT YOUR VISIT


CONTINUE HOME MEDICATION 


   TORADOL 10 MG PO Q6H FOR PAIN FOR 3 DAYS


keep splint dry/intact


elevation


ice


sling


 Patient o follow up in office on Friday 1/18 call for appointment


ACTIVITY AS TOLERATED


CALL DR LILLY OR GO TO THE EMERGENCY ROOM IF SYMPTOM RETURN OR WORSENING

## 2019-01-16 NOTE — CP.PCM.PN
Subjective





- Date & Time of Evaluation


Date of Evaluation: 01/16/19


Time of Evaluation: 15:11





- Subjective


Subjective: 


Patient states she is comfortable right now. She says she has a lot of pain but 

the medication controls it. Denies numbness/tingling. Just returned from echo





Objective





- Vital Signs/Intake and Output


Vital Signs (last 24 hours): 


                                        











Temp Pulse Resp BP Pulse Ox


 


 98.4 F   98 H  20   108/66   95 


 


 01/16/19 07:48  01/16/19 10:27  01/16/19 07:48  01/16/19 10:27  01/16/19 07:48











- Medications


Medications: 


                               Current Medications





Ergocalciferol (Drisdol 50,000 Intl Units Cap)  1 cap PO QWK [facility]


   Last Admin: 01/16/19 10:28 Dose:  1 cap


Gabapentin (Neurontin)  300 mg PO BID [facility]


   Last Admin: 01/16/19 10:28 Dose:  300 mg


Hydrochlorothiazide (Microzide)  12.5 mg PO DAILY [facility]


   Last Admin: 01/16/19 10:28 Dose:  12.5 mg


Ketorolac Tromethamine (Toradol)  30 mg IVP Q6 PRN


   PRN Reason: Pain, moderate (4-7)


   Last Admin: 01/16/19 06:17 Dose:  30 mg


Losartan Potassium (Cozaar)  100 mg PO DAILY [facility]


   Last Admin: 01/16/19 10:28 Dose:  100 mg


Montelukast Sodium (Singulair)  10 mg PO HS [facility]


   Last Admin: 01/15/19 22:28 Dose:  Not Given


Pantoprazole Sodium (Protonix Ec Tab)  40 mg PO DAILY [facility]


   Last Admin: 01/16/19 10:28 Dose:  40 mg











- Labs


Labs: 


                                        





                                 01/16/19 11:36 





                                 01/16/19 11:36 





                                        











PT  11.2 SECONDS (9.7-12.2)   01/14/19  23:33    


 


INR  1.0   01/14/19  23:33    


 


APTT  32 SECONDS (21-34)   01/14/19  23:33    














- Extremities Exam


Additional comments: 





Left hand elevated, sugar tong splint intact, sensation itnact to med/ulnar/rad 

nerve distrib, +ROM fingers/thumb flex/ext/add/abd, swelling to fingers improved





Assessment and Plan


(1) Closed fracture of left distal radius


Assessment & Plan: 


keep splint dry/intact


elevation


ice


sling


d/w Dr. Epps, agrees with above, indicated for ORIF of left wrist, will f/u 

plan with Dr. Epps's partner Dr. Hu (hand specialist) as outpt to be 

scheduled. Patient o follow up in office on Friday 1/18 call for appointment


ortho stable for d/c


d/w Dr. Hu, agrees with above. 


Status: Acute   





(2) Distal end of ulna fracture, closed


Status: Acute   





(3) Fracture of lunate, left wrist, closed


Status: Acute

## 2019-01-17 VITALS — OXYGEN SATURATION: 97 % | TEMPERATURE: 98.5 F

## 2019-01-17 VITALS — HEART RATE: 82 BPM | SYSTOLIC BLOOD PRESSURE: 137 MMHG | DIASTOLIC BLOOD PRESSURE: 82 MMHG

## 2019-01-17 RX ADMIN — PANTOPRAZOLE SODIUM SCH MG: 40 TABLET, DELAYED RELEASE ORAL at 09:18

## 2019-01-17 NOTE — CP.PCM.DIS
Provider





- Provider


Date of Admission: 


01/14/19 22:14





Attending physician: 


Shankar Lazcano MD





Consults: 








01/15/19 00:55


Orthopedic Consult Routine 


   Comment: comminuted distal radial/ulna fracture


   Consulting Provider: Caitlyn Epps


   Consulting Physician: Caitlyn Epps


   Reason for Consult: comminuted distal radial/ulna fracture











Time Spent in preparation of Discharge (in minutes): 30





Diagnosis





- Discharge Diagnosis


(1) Closed fracture of left distal radius


Status: Acute   





(2) Distal end of ulna fracture, closed


Status: Acute   





(3) Facial contusion


Status: Acute   





(4) Fall


Status: Acute   





(5) Minor head injury


Status: Acute   





(6) Asthma


Status: Chronic   





(7) HTN (hypertension)


Status: Chronic   





(8) Tachycardia


Status: Acute   





Hospital Course





- Lab Results


Lab Results: 


                             Most Recent Lab Values











WBC  7.9 K/uL (4.8-10.8)   01/16/19  11:36    


 


RBC  3.89 Mil/uL (3.80-5.20)   01/16/19  11:36    


 


Hgb  12.7 g/dL (11.0-16.0)   01/16/19  11:36    


 


Hct  36.8 % (34.0-47.0)   01/16/19  11:36    


 


MCV  94.7 fL (81.0-99.0)   01/16/19  11:36    


 


MCH  32.6 pg (27.0-31.0)  H  01/16/19  11:36    


 


MCHC  34.5 g/dL (33.0-37.0)   01/16/19  11:36    


 


RDW  13.0 % (11.5-14.5)   01/16/19  11:36    


 


Plt Count  174 K/uL (130-400)   01/16/19  11:36    


 


MPV  8.7 fL (7.2-11.7)   01/16/19  11:36    


 


Neut % (Auto)  62.7 % (50.0-75.0)   01/16/19  11:36    


 


Lymph % (Auto)  28.1 % (20.0-40.0)   01/16/19  11:36    


 


Mono % (Auto)  6.9 % (0.0-10.0)   01/16/19  11:36    


 


Eos % (Auto)  1.7 % (0.0-4.0)   01/16/19  11:36    


 


Baso % (Auto)  0.6 % (0.0-2.0)   01/16/19  11:36    


 


Neut # (Auto)  5.0 K/uL (1.8-7.0)   01/16/19  11:36    


 


Lymph # (Auto)  2.2 K/uL (1.0-4.3)   01/16/19  11:36    


 


Mono # (Auto)  0.5 K/uL (0.0-0.8)   01/16/19  11:36    


 


Eos # (Auto)  0.1 K/uL (0.0-0.7)   01/16/19  11:36    


 


Baso # (Auto)  0.0 K/uL (0.0-0.2)   01/16/19  11:36    


 


PT  11.2 SECONDS (9.7-12.2)   01/14/19  23:33    


 


INR  1.0   01/14/19  23:33    


 


APTT  32 SECONDS (21-34)   01/14/19  23:33    


 


Sodium  135 mmol/L (132-148)   01/16/19  11:36    


 


Potassium  4.0 mmol/L (3.6-5.2)   01/16/19  11:36    


 


Chloride  103 mmol/L ()   01/16/19  11:36    


 


Carbon Dioxide  26 mmol/L (22-30)   01/16/19  11:36    


 


Anion Gap  10  (10-20)   01/16/19  11:36    


 


BUN  26 mg/dL (7-17)  H  01/16/19  11:36    


 


Creatinine  0.9 mg/dL (0.7-1.2)   01/16/19  11:36    


 


Est GFR ( Amer)  > 60   01/16/19  11:36    


 


Est GFR (Non-Af Amer)  > 60   01/16/19  11:36    


 


Random Glucose  113 mg/dL ()  H  01/16/19  11:36    


 


Calcium  10.6 mg/dl (8.6-10.4)  H  01/16/19  11:36    


 


Total Bilirubin  1.5 mg/dL (0.2-1.3)  H  01/16/19  11:36    


 


AST  21 U/L (14-36)   01/16/19  11:36    


 


ALT  14 U/L (9-52)   01/16/19  11:36    


 


Alkaline Phosphatase  76 U/L ()   01/16/19  11:36    


 


Total Creatine Kinase  125 U/L ()   01/15/19  15:23    


 


CK-MB (Mass)  1.79 ng/mL (0.0-3.38)   01/15/19  15:23    


 


Troponin I  < 0.0120 ng/mL (0.00-0.120)   01/15/19  15:23    


 


Total Protein  7.0 g/dL (6.3-8.3)   01/16/19  11:36    


 


Albumin  4.1 g/dL (3.5-5.0)   01/16/19  11:36    


 


Globulin  2.9 gm/dL (2.2-3.9)   01/16/19  11:36    


 


Albumin/Globulin Ratio  1.4  (1.0-2.1)   01/16/19  11:36    


 


Urine Color  Yellow  (YELLOW)   01/15/19  02:14    


 


Urine Clarity  Clear  (Clear)   01/15/19  02:14    


 


Urine pH  6.0  (5.0-8.0)   01/15/19  02:14    


 


Ur Specific Gravity  1.011  (1.003-1.030)   01/15/19  02:14    


 


Urine Protein  Negative mg/dL (NEGATIVE)   01/15/19  02:14    


 


Urine Glucose (UA)  Negative mg/dL (Normal)   01/15/19  02:14    


 


Urine Ketones  Negative mg/dL (NEGATIVE)   01/15/19  02:14    


 


Urine Blood  1+  (NEGATIVE)  H  01/15/19  02:14    


 


Urine Nitrate  Negative  (NEGATIVE)   01/15/19  02:14    


 


Urine Bilirubin  Negative  (NEGATIVE)   01/15/19  02:14    


 


Urine Urobilinogen  Normal mg/dL (0.2-1.0)   01/15/19  02:14    


 


Ur Leukocyte Esterase  Negative Josefina/uL (Negative)   01/15/19  02:14    


 


Urine WBC (Auto)  1 /hpf (0-5)   01/15/19  02:14    


 


Urine RBC (Auto)  4 /hpf (0-3)  H  01/15/19  02:14    


 


Ur Squamous Epith Cells  3 /hpf (0-5)   01/15/19  02:14    


 


Urine Bacteria  Rare  (<OCC)   01/15/19  02:14    


 


Hyaline Casts  0-2 /lpf (0-2)   01/15/19  02:14    














- Hospital Course


Hospital Course: 





73 year old female presents to the emergency department status-post trauma after

her dog pulled her and she fell down 3 steps. Patient denies LOC and states that

she remembers the event. Patient states that she sustained abrasions over the 

left cheekbone, and also complaints of pain to the left wrist





patient has a history of cardiac arrhythmia hypertension.


The x-ray of the left wrist showed comminuted fracture of the radius and ulna 

spiral fracture on the radius there is also mild fracture of the left orbit with

abnormal CT scan of the head of the frontal lobe





Patient cardiac workup was negative.


Closed reduction was done x-ray did not show any much improvement.


As per the orthopedic service patient can be discharged and will follow 

outpatient for readmission for ORIF.


Currently patient is stable will discharge and follow with orthopedic service





Discharge Plan





- Discharge Medications


Prescriptions: 


Ketorolac Tromethamine [Toradol] 10 mg PO Q6H #12 tab





- Follow Up Plan


Condition: FAIR


Disposition: HOME/ ROUTINE


Instructions:  Heart Healthy Diet, Preventing Falls in the Older Adult, Forearm 

Fracture (DC), Wrist Fracture (DC), Radius Fracture (DC), Tromethamine


Additional Instructions: 


FOLLOW UP WITH DR LAZCANO IN HID OFFICE -----CALL FOR APPOINTEMNT


FOLLOW UP WITH DR EPPS IN HIS OFFICE ON FRIDAY 


   ADDRESS ORIF of left wrist AT YOUR VISIT


CONTINUE HOME MEDICATION 


   TORADOL 10 MG PO Q6H FOR PAIN FOR 3 DAYS


keep splint dry/intact


elevation


ice


sling


 Patient o follow up in office on Friday 1/18 call for appointment


ACTIVITY AS TOLERATED


CALL DR LAZCANO OR GO TO THE EMERGENCY ROOM IF SYMPTOM RETURN OR WORSENING


Referrals: 


Caitlyn Epps MD [Staff Provider] - 


Shankar Lazcano MD [Staff Provider] -